# Patient Record
Sex: MALE | Race: WHITE
[De-identification: names, ages, dates, MRNs, and addresses within clinical notes are randomized per-mention and may not be internally consistent; named-entity substitution may affect disease eponyms.]

---

## 2018-12-10 ENCOUNTER — HOSPITAL ENCOUNTER (OUTPATIENT)
Dept: HOSPITAL 47 - RADNMMAIN | Age: 73
Discharge: HOME | End: 2018-12-10
Payer: OTHER GOVERNMENT

## 2018-12-10 DIAGNOSIS — I25.10: ICD-10-CM

## 2018-12-10 DIAGNOSIS — I49.3: Primary | ICD-10-CM

## 2018-12-10 PROCEDURE — 93017 CV STRESS TEST TRACING ONLY: CPT

## 2018-12-10 NOTE — EST
EXERCISE STRESS



AGE:

73



SEX:

M



HT:

73



WT:

181



PROTOCOL:

Arie Stress test



STAGE:

II



DURATION OF EXERCISE:

7:00



HEART RATE REST:

68



BLOOD PRESSURE REST:

147/83



MAXIMUM HEART RATE ACHIEVED:

135



MAXIMUM BLOOD PRESSURE:

209/77



85% MPHR:

125



100% MPHR:

147



METS:

8.5



INDICATIONS:

Cardiac disease



CLINICAL INFORMATION:

Baseline rhythm is sinus mechanism rate 68, normal axis, occasional PACs.  Baseline

blood pressure 147/83 mmHg.  Patient exercised on Arie protocol for 7 minutes reaching

peak rate 135 beats per minute which is equal to 91% maximum predicted heart rate.

Peak blood pressure 209/77 mmHg.  Test was terminated due to fatigue.  There was no

chest pain.  Electrocardiograph monitoring revealed occasional PACs and PVCs.  There

was no evidence of diagnostic ischemic ST deviation.



CONCLUSION:

1. Good exercise tolerance with occasional PACs and PVCs.

2. Normal electrocardiograph response to exercise with no evidence of exercise-induced

    ischemia.





MMODL / IJN: [id number] / Job#: [id number]